# Patient Record
Sex: FEMALE | Race: WHITE | NOT HISPANIC OR LATINO | Employment: FULL TIME | URBAN - METROPOLITAN AREA
[De-identification: names, ages, dates, MRNs, and addresses within clinical notes are randomized per-mention and may not be internally consistent; named-entity substitution may affect disease eponyms.]

---

## 2018-10-19 ENCOUNTER — OFFICE VISIT (OUTPATIENT)
Dept: URGENT CARE | Facility: CLINIC | Age: 48
End: 2018-10-19
Payer: COMMERCIAL

## 2018-10-19 VITALS
OXYGEN SATURATION: 97 % | WEIGHT: 160 LBS | HEIGHT: 64 IN | BODY MASS INDEX: 27.31 KG/M2 | RESPIRATION RATE: 18 BRPM | HEART RATE: 96 BPM | DIASTOLIC BLOOD PRESSURE: 95 MMHG | TEMPERATURE: 98 F | SYSTOLIC BLOOD PRESSURE: 151 MMHG

## 2018-10-19 DIAGNOSIS — Z76.89 ESTABLISHING CARE WITH NEW DOCTOR, ENCOUNTER FOR: ICD-10-CM

## 2018-10-19 DIAGNOSIS — R35.0 URINARY FREQUENCY: ICD-10-CM

## 2018-10-19 DIAGNOSIS — Z11.3 ENCOUNTER FOR SCREENING EXAMINATION FOR SEXUALLY TRANSMITTED DISEASE: Primary | ICD-10-CM

## 2018-10-19 DIAGNOSIS — F17.200 TOBACCO DEPENDENCE: ICD-10-CM

## 2018-10-19 LAB
BILIRUB UR QL STRIP: NEGATIVE
GLUCOSE UR QL STRIP: NEGATIVE
KETONES UR QL STRIP: NEGATIVE
LEUKOCYTE ESTERASE UR QL STRIP: NEGATIVE
PH, POC UA: 6 (ref 5–8)
POC BLOOD, URINE: NEGATIVE
POC NITRATES, URINE: NEGATIVE
PROT UR QL STRIP: NEGATIVE
SP GR UR STRIP: 1.01 (ref 1–1.03)
UROBILINOGEN UR STRIP-ACNC: NORMAL (ref 0.1–1.1)

## 2018-10-19 PROCEDURE — 81003 URINALYSIS AUTO W/O SCOPE: CPT | Mod: QW,S$GLB,, | Performed by: NURSE PRACTITIONER

## 2018-10-19 PROCEDURE — 99204 OFFICE O/P NEW MOD 45 MIN: CPT | Mod: 25,S$GLB,, | Performed by: NURSE PRACTITIONER

## 2018-10-19 PROCEDURE — 3008F BODY MASS INDEX DOCD: CPT | Mod: CPTII,S$GLB,, | Performed by: NURSE PRACTITIONER

## 2018-10-19 RX ORDER — LORATADINE 10 MG/1
10 TABLET ORAL DAILY
COMMUNITY
End: 2018-10-24 | Stop reason: SDUPTHER

## 2018-10-19 RX ORDER — OMEPRAZOLE 10 MG/1
10 CAPSULE, DELAYED RELEASE ORAL DAILY
COMMUNITY
End: 2018-10-24 | Stop reason: DRUGHIGH

## 2018-10-19 RX ORDER — CITALOPRAM 10 MG/1
10 TABLET ORAL DAILY
COMMUNITY
End: 2018-10-24 | Stop reason: SDUPTHER

## 2018-10-19 NOTE — PATIENT INSTRUCTIONS
If You Think You Have an STD  Treating a sexually transmitted disease (STD) early limits the problems they can cause. If you have an STD, get treated right away. Ask your partner to be tested, too. Then avoid sex until youve finished treatment and your healthcare provider says its OK to have sex again.    Follow your treatment plan  Treatment depends on the type of STD you have. Common treatments include injections and oral pills or liquids. Creams and gels can be applied to sores caused by certain STDs. Follow the tips below:  · Get new treatment for each new STD.  · Dont use old medicine, even for the same STD. Use medicines as directed.  · Dont share medicine unless instructed to do so by your healthcare provider or clinic.  Talk to your partner  If you have an STD, its your duty to tell all your recent partners so they can be tested and treated. This is one important way to prevent the disease from being spread. Telling a partner that you have an STD can be hard. You may be embarrassed, angry, or afraid. Its often unclear who had the STD first. So try not to place blame. Your healthcare provider may offer some advice on how to begin.  Prevent future problems  Even after youve been treated, you can still be infected again. This is a common problem. It happens when a partner passes the STD back to you. To avoid this, your partner must be tested. He or she may also need treatment. After treatment, go to any scheduled follow-up visits. Then prevent future problems with safer sex. Limit your number of partners and always use a latex condom.  Diagnosing STDS  Your healthcare provider will take a health history and examine you. During your health history, you will be asked about your sex habits and health history. You may also be asked about drug use. Give honest answers. Your healthcare provider will then check your body for signs of STDs. He or she also may perform one or more of the following  tests:  · Fluid is swabbed from any sores. Samples also may be taken from the vagina, penis, mouth, or rectum. The samples are then tested for STDs.  · Blood or urine samples may be taken. They are checked for viruses or bacteria that cause STDs.  · For women, cells from the cervix (where the vagina and uterus meet) are checked for signs of cancer. This is called a Pap smear. If cell changes are found, a magnifying scope may be used to take a closer look (colposcopy).   Date Last Reviewed: 12/1/2016  © 6825-2990 [employer]. [address] [place] [address], [place] [address]. All rights reserved. This information is not intended as a substitute for professional medical care. Always follow your healthcare professional's instructions.    Please arrange follow up with your primary medical clinic as soon as possible. You must understand that you've received an Urgent Care treatment only and that you may be released before all of your medical problems are known or treated. You, the patient, will arrange for follow up as instructed. If your symptoms worsen or fail to improve you should go to the Emergency Room.

## 2018-10-19 NOTE — PROGRESS NOTES
"Subjective:       Patient ID: Esme Mehta is a 48 y.o. female.    Vitals:  height is 5' 4" (1.626 m) and weight is 72.6 kg (160 lb). Her tympanic temperature is 98 °F (36.7 °C). Her blood pressure is 151/95 (abnormal) and her pulse is 96. Her respiration is 18 and oxygen saturation is 97%.     Chief Complaint: Urinary Frequency    Patient presents with c/o urinary frequency x 1 days. Symptom started yesterday. Patient tried at home test and she is worried she has a uti. Patient gives hx of having unprotected sex last weekend. No concern for pregnancy. Patient would like to be tested for stds.       Urinary Frequency    This is a new problem. The current episode started acute onset. The problem occurs every urination. The problem has been unchanged. She is sexually active. There is no history of pyelonephritis. Associated symptoms include frequency. Pertinent negatives include no chills, hematuria, nausea, urgency or vomiting. She has tried nothing for the symptoms. The treatment provided mild relief.     Review of Systems   Constitution: Negative for chills and fever.   Skin: Negative for itching.   Musculoskeletal: Negative for back pain.   Gastrointestinal: Negative for abdominal pain, nausea and vomiting.   Genitourinary: Positive for frequency. Negative for dysuria, genital sores, hematuria, missed menses, non-menstrual bleeding and urgency.   All other systems reviewed and are negative.      Objective:      Physical Exam   Constitutional: She is oriented to person, place, and time. She appears well-developed and well-nourished.   HENT:   Head: Normocephalic and atraumatic.   Right Ear: External ear normal.   Left Ear: External ear normal.   Nose: Nose normal.   Eyes: Lids are normal.   Neck: Trachea normal, normal range of motion and phonation normal. Neck supple.   Cardiovascular: Normal pulses.   Pulmonary/Chest: Effort normal.   Abdominal: Soft. Normal appearance and bowel sounds are normal. She " exhibits no distension. There is no tenderness. There is no CVA tenderness. Hernia confirmed negative in the right inguinal area and confirmed negative in the left inguinal area.   Genitourinary: Vagina normal. Pelvic exam was performed with patient supine. There is no rash, tenderness, lesion or injury on the right labia. There is no rash, tenderness, lesion or injury on the left labia. Cervix exhibits discharge (thin white discharge, IUD string visable on exam). Cervix exhibits no motion tenderness.   Lymphadenopathy: No inguinal adenopathy noted on the right or left side.   Neurological: She is alert and oriented to person, place, and time.   Skin: Skin is warm, dry and intact.   Psychiatric: She has a normal mood and affect. Her speech is normal and behavior is normal. Cognition and memory are normal.   Nursing note and vitals reviewed.      Assessment:       1. Encounter for screening examination for sexually transmitted disease    2. Urinary frequency    3. Tobacco dependence    4. Establishing care with new doctor, encounter for        Plan:     F/u with GYN and PCP.     Encounter for screening examination for sexually transmitted disease  -     NuSwab Vaginitis Plus (VG+)  -     HIV-1 and HIV-2 antibodies  -     RPR  -     Hepatitis panel, acute    Urinary frequency  -     POCT Urinalysis, Dipstick, Automated, W/O Scope  -     Urine culture    Tobacco dependence  -     Ambulatory referral to Smoking Cessation Program    Establishing care with new doctor, encounter for  -     Ambulatory referral to Internal Medicine  -     Ambulatory referral to Gynecology

## 2018-10-21 LAB
HAV IGM SERPL QL IA: NEGATIVE
HBV CORE IGM SERPL QL IA: NEGATIVE
HBV SURFACE AG SERPL QL IA: NEGATIVE
HCV AB S/CO SERPL IA: <0.1 S/CO RATIO (ref 0–0.9)
HIV 1+2 AB+HIV1 P24 AG SERPL QL IA: NON REACTIVE
RPR SER QL: NON REACTIVE

## 2018-10-24 ENCOUNTER — OFFICE VISIT (OUTPATIENT)
Dept: INTERNAL MEDICINE | Facility: CLINIC | Age: 48
End: 2018-10-24
Payer: COMMERCIAL

## 2018-10-24 VITALS
HEART RATE: 97 BPM | WEIGHT: 163.81 LBS | DIASTOLIC BLOOD PRESSURE: 102 MMHG | OXYGEN SATURATION: 98 % | BODY MASS INDEX: 27.96 KG/M2 | SYSTOLIC BLOOD PRESSURE: 168 MMHG | RESPIRATION RATE: 12 BRPM | HEIGHT: 64 IN

## 2018-10-24 DIAGNOSIS — R06.81 WITNESSED APNEIC SPELLS: ICD-10-CM

## 2018-10-24 DIAGNOSIS — F17.210 LIGHT TOBACCO SMOKER <10 CIGARETTES PER DAY: ICD-10-CM

## 2018-10-24 DIAGNOSIS — F41.9 ANXIETY DISORDER, UNSPECIFIED TYPE: ICD-10-CM

## 2018-10-24 DIAGNOSIS — K58.2 IRRITABLE BOWEL SYNDROME WITH BOTH CONSTIPATION AND DIARRHEA: ICD-10-CM

## 2018-10-24 DIAGNOSIS — Z13.1 SCREENING FOR DIABETES MELLITUS: ICD-10-CM

## 2018-10-24 DIAGNOSIS — F31.60 BIPOLAR AFFECTIVE DISORDER, CURRENT EPISODE MIXED, CURRENT EPISODE SEVERITY UNSPECIFIED: ICD-10-CM

## 2018-10-24 DIAGNOSIS — Z00.00 ANNUAL PHYSICAL EXAM: Primary | ICD-10-CM

## 2018-10-24 DIAGNOSIS — J30.9 ALLERGIC RHINITIS, UNSPECIFIED SEASONALITY, UNSPECIFIED TRIGGER: ICD-10-CM

## 2018-10-24 DIAGNOSIS — R06.83 SNORING: ICD-10-CM

## 2018-10-24 DIAGNOSIS — R03.0 ELEVATED BLOOD PRESSURE READING IN OFFICE WITHOUT DIAGNOSIS OF HYPERTENSION: ICD-10-CM

## 2018-10-24 DIAGNOSIS — Z13.220 SCREENING FOR LIPID DISORDERS: ICD-10-CM

## 2018-10-24 DIAGNOSIS — R40.0 DAYTIME SOMNOLENCE: ICD-10-CM

## 2018-10-24 DIAGNOSIS — R07.9 CHEST PAIN OF UNCERTAIN ETIOLOGY: ICD-10-CM

## 2018-10-24 DIAGNOSIS — K21.9 GASTROESOPHAGEAL REFLUX DISEASE, ESOPHAGITIS PRESENCE NOT SPECIFIED: ICD-10-CM

## 2018-10-24 LAB
A VAGINAE DNA VAG QL NAA+PROBE: ABNORMAL SCORE
BACTERIA UR CULT: NO GROWTH
BACTERIA UR CULT: NORMAL
BVAB2 DNA VAG QL NAA+PROBE: ABNORMAL SCORE
C ALBICANS DNA VAG QL NAA+PROBE: NEGATIVE
C GLABRATA DNA VAG QL NAA+PROBE: NEGATIVE
C TRACH RRNA SPEC QL NAA+PROBE: NEGATIVE
MEGA1 DNA VAG QL NAA+PROBE: ABNORMAL SCORE
N GONORRHOEA RRNA SPEC QL NAA+PROBE: NEGATIVE
T VAGINALIS RRNA SPEC QL NAA+PROBE: NEGATIVE

## 2018-10-24 PROCEDURE — 99386 PREV VISIT NEW AGE 40-64: CPT | Mod: S$GLB,,, | Performed by: FAMILY MEDICINE

## 2018-10-24 PROCEDURE — 99999 PR PBB SHADOW E&M-EST. PATIENT-LVL V: CPT | Mod: PBBFAC,,, | Performed by: FAMILY MEDICINE

## 2018-10-24 RX ORDER — LORATADINE 10 MG/1
10 TABLET ORAL DAILY
Qty: 90 TABLET | Refills: 1 | Status: SHIPPED | OUTPATIENT
Start: 2018-10-24

## 2018-10-24 RX ORDER — OMEPRAZOLE 20 MG/1
20 CAPSULE, DELAYED RELEASE ORAL DAILY
Qty: 90 CAPSULE | Refills: 0 | Status: SHIPPED | OUTPATIENT
Start: 2018-10-24 | End: 2019-01-19 | Stop reason: SDUPTHER

## 2018-10-24 RX ORDER — OMEPRAZOLE 10 MG/1
10 CAPSULE, DELAYED RELEASE ORAL DAILY
Status: CANCELLED | OUTPATIENT
Start: 2018-10-24

## 2018-10-24 RX ORDER — CITALOPRAM 10 MG/1
10 TABLET ORAL DAILY
Qty: 30 TABLET | Refills: 2 | Status: SHIPPED | OUTPATIENT
Start: 2018-10-24

## 2018-10-24 RX ORDER — OMEPRAZOLE 20 MG/1
20 CAPSULE, DELAYED RELEASE ORAL DAILY
COMMUNITY
End: 2018-10-24 | Stop reason: SDUPTHER

## 2018-10-24 RX ORDER — QUETIAPINE FUMARATE 50 MG/1
50 TABLET, FILM COATED ORAL NIGHTLY
Qty: 30 TABLET | Refills: 0 | Status: SHIPPED | OUTPATIENT
Start: 2018-10-24 | End: 2019-04-29 | Stop reason: ALTCHOICE

## 2018-10-24 NOTE — PROGRESS NOTES
Subjective:       Patient ID: Esme Mehta is a 48 y.o. female.    Chief Complaint: Establish Care    HPI  This patient is new to me.   Esme Mehta is a 48 y.o. year old female with anxiety and bipolar depression, IBS who presents today to establish care.     Moved from [place] 8 months ago. Was seeing psychiatry.  Anxiety and bipolar depression - used to take Prozac and Wellbutrin with a benzo - was working.   Spend a few days inpatient psych years ago. Hx of bad relationships.   Has manic episodes more lately. Symptoms include excited feeling in stomach, feeling anxious.   Says she sleeps ok.   Hx two suicide attempts in .   Does have suicidal thoughts occasionally. Says she would not hurt herself. Is a Christianity, her father, dog are protective factors.   Doesn't feel Celexa is helping much, still having anxiety and depression. Feels manic off and on. She does not have a psychiatrist.     Has apneic episodes at night, daytime fatigue, does snore.     Abdominal pain - off and on. Present for a few months. Sometimes worse around meals. Has some vomiting after meals sometimes - occurs a couple times a month. Has a feeling of food stuck in her throat sometimes.   Sometimes sees very dark stool.   Acid reflux - takes omeprazole 10mg daily. Sometimes helps.     Occasional chest pain. No trigger. Mostly occurs with anxiety. Lasts for a couple minutes. Breathing exercises helps.     Hx of breast CA - BRCA neg (was tested a few years ago)  She is a tobacco smoker and was referred to smoking cessation at recent urgent care appt. Waiting to hear from smoking cessation program.     OB/GYN History     Unknown if patient is post-menopausal due to IUD - does mention hot flashes  Sexually active: yes  Contraception: IUD - 5 years ago  Last Pap smear: 2013 - normal  Mammogram: 2013 - normal. Has had biopsies - negative.     Has gyn appt soon.     I personally reviewed Past Medical History, Past  "Surgical History, Social History, and Family History    Review of Systems   Constitutional: Positive for fatigue. Negative for chills, fever and unexpected weight change.   HENT: Negative for congestion, hearing loss, rhinorrhea and sore throat.    Eyes: Negative for visual disturbance.   Respiratory: Positive for cough. Negative for shortness of breath and wheezing.    Cardiovascular: Positive for chest pain and palpitations. Negative for leg swelling.   Gastrointestinal: Positive for abdominal pain, constipation, diarrhea, nausea and vomiting. Negative for blood in stool.        +food getting stuck   Genitourinary: Negative for dysuria, frequency, menstrual problem and urgency.   Musculoskeletal: Negative for arthralgias and myalgias.   Skin: Negative for rash.   Neurological: Negative for dizziness, syncope and headaches.   Psychiatric/Behavioral: Positive for dysphoric mood and suicidal ideas. Negative for self-injury and sleep disturbance. The patient is nervous/anxious.        Objective:      Vitals:    10/24/18 1524   BP: (!) 168/102   Pulse: 97   Resp: 12   SpO2: 98%   Weight: 74.3 kg (163 lb 12.8 oz)   Height: 5' 4" (1.626 m)     Physical Exam   Constitutional: She is oriented to person, place, and time. She appears well-developed and well-nourished. No distress.   HENT:   Head: Normocephalic and atraumatic.   Right Ear: Hearing, tympanic membrane, external ear and ear canal normal.   Left Ear: Hearing, tympanic membrane, external ear and ear canal normal.   Nose: Nose normal.   Mouth/Throat: Oropharynx is clear and moist and mucous membranes are normal. No oropharyngeal exudate.   Eyes: Conjunctivae and lids are normal. Pupils are equal, round, and reactive to light.   Neck: Normal range of motion. No thyroid mass and no thyromegaly present.   Cardiovascular: Normal rate, regular rhythm, S1 normal, S2 normal and intact distal pulses.   No murmur heard.  No lower extremity edema.    Pulmonary/Chest: Effort " normal and breath sounds normal. No respiratory distress.   Abdominal: Soft. Normal appearance and bowel sounds are normal. There is no tenderness. There is negative Davis's sign.   Lymphadenopathy:     She has no cervical adenopathy.        Right: No supraclavicular adenopathy present.        Left: No supraclavicular adenopathy present.   Neurological: She is alert and oriented to person, place, and time.   Skin: Skin is warm and dry. No rash noted.   Psychiatric: She has a normal mood and affect. Her behavior is normal. Thought content normal.   Nursing note and vitals reviewed.      Assessment:       1. Annual physical exam    2. Bipolar affective disorder, current episode mixed, current episode severity unspecified    3. Anxiety disorder, unspecified type    4. Elevated blood pressure reading in office without diagnosis of hypertension    5. Light tobacco smoker <10 cigarettes per day    6. Daytime somnolence    7. Witnessed apneic spells    8. Snoring    9. Chest pain of uncertain etiology    10. Irritable bowel syndrome with both constipation and diarrhea    11. Gastroesophageal reflux disease, esophagitis presence not specified    12. Allergic rhinitis, unspecified seasonality, unspecified trigger    13. Screening for lipid disorders    14. Screening for diabetes mellitus        Plan:   Diagnoses and all orders for this visit:    Annual physical exam  - Screening labs ordered. Discussed healthy diet and exercise. Recommended that she have Tdap vaccine. She will see gyn soon and get up to date with Pap and mammo.   -     CBC auto differential; Future  -     Comprehensive metabolic panel; Future  -     Lipid panel; Future  -     TSH; Future    Bipolar affective disorder, current episode mixed, current episode severity unspecified  Anxiety disorder, unspecified type        - Patient referred to psychiatry. Sole SSRI treatment is not appropriate for bipolar disorder typically. Will continue Celexa for now, but  add Seroquel at bedtime until she can see psych. Discussed suicide risk and seeking help for suicidal thoughts. RTC in 4 weeks for follow-up.   -     citalopram (CELEXA) 10 MG tablet; Take 1 tablet (10 mg total) by mouth once daily.  -     Ambulatory Referral to Psychiatry  -     QUEtiapine (SEROQUEL) 50 MG tablet; Take 1 tablet (50 mg total) by mouth every evening.    Elevated blood pressure in the office without diagnosis of hypertension         - Patient will RTC in 2 weeks for nurse visit to recheck BP. Will add pharmacotherapy if elevated.     Light tobacco smoker <10 cigarettes each day          - Counseled patient on cessation. She plans to quit with assistance of tobacco cessation program.     Daytime somnolence  Witnessed apneic spells  Snoring  - Will refer to sleep center for sleep study   -     Ambulatory consult to Sleep Disorders    Chest pain of uncertain etiology        - Nonexertional and seems to be due to GERD. Not having chest pain now. Will check EKG for baseline and manage accordingly.   -     SCHEDULED EKG 12-LEAD (to Muse); Future    Irritable bowel syndrome with both constipation and diarrhea  -     Ambulatory Referral to Gastroenterology    Gastroesophageal reflux disease, esophagitis presence not specified        - Discussed GERD diet and lifestyle changes. Refer to GI for possible endoscopy.   -     Ambulatory Referral to Gastroenterology  -     omeprazole (PRILOSEC) 20 MG capsule; Take 1 capsule (20 mg total) by mouth once daily.    Allergic rhinitis, unspecified seasonality, unspecified trigger  -     loratadine (CLARITIN) 10 mg tablet; Take 1 tablet (10 mg total) by mouth once daily.    Screening for lipid disorders  -     Lipid panel; Future    Screening for diabetes mellitus  -     Comprehensive metabolic panel; Future

## 2018-10-24 NOTE — LETTER
October 24, 2018      Nallely Bob, DANGELO  [address] [facility] [place] [address]           [facility]  [address]  [facility] [address]  Phone: [phone]  Fax: [phone]          Patient: Esme Mehta   MR Number: [id number]   YOB: 1970   Date of Visit: 10/24/2018       Dear Nallely Bob:    Thank you for referring Esme Mehta to me for evaluation. Attached you will find relevant portions of my assessment and plan of care.    If you have questions, please do not hesitate to call me. I look forward to following Esme Mehta along with you.    Sincerely,    Hailey Khan MD    Enclosure  CC:  No Recipients    If you would like to receive this communication electronically, please contact [email or web address] or [phone] to request more information on [employer] access.    For providers and/or their staff who would like to refer a patient to [facility], please contact us through our one-stop-shop provider referral line, [facility], at [phone].    If you feel you have received this communication in error or would no longer like to receive these types of communications, please e-mail [email or web address]

## 2018-10-24 NOTE — PATIENT INSTRUCTIONS
Other psychiatrists:  Dr. Desmond Ponce - [phone]  Dr. Kem Cole - [phone]  Dr. Shayy Finn - [phone]  Dr. Allison Taveras - [phone]  Dr. Nicholas Jensen - [phone]    Tips to Control Acid Reflux    To control acid reflux, youll need to make some basic diet and lifestyle changes. The simple steps outlined below may be all youll need to ease discomfort.  Watch what you eat  · Avoid fatty foods and spicy foods.  · Eat fewer acidic foods, such as citrus and tomato-based foods. These can increase symptoms.  · Limit drinking alcohol, caffeine, and fizzy beverages. All increase acid reflux.  · Try limiting chocolate, peppermint, and spearmint. These can worsen acid reflux in some people.  Watch when you eat  · Avoid lying down for 3 hours after eating.  · Do not snack before going to bed.  Raise your head  Raising your head and upper body by 4 to 6 inches helps limit reflux when youre lying down. Put blocks under the head of your bed frame to raise it.  Other changes  · Lose weight, if you need to  · Dont exercise near bedtime  · Avoid tight-fitting clothes  · Limit aspirin and ibuprofen  · Stop smoking   Date Last Reviewed: 7/1/2016  © 2288-6398 [employer]. [address] [place] [address], [place] [address]. All rights reserved. This information is not intended as a substitute for professional medical care. Always follow your healthcare professional's instructions.        Treating Bipolar Disorder    Bipolar disorder results in extreme mood swings that can greatly disrupt your life. These symptoms may cause you distress. But with treatment, you can lead a more normal life.  Medicines  Bipolar disorder is often treated with medicines that stabilize moods. They help you feel better by keeping your moods more even, and help prevent future mood swings. Sometimes you may also be prescribed medicines that treat depression. All medicines can have side effects. If youre  troubled by side effects, tell your healthcare provider. Changing the dose or type of your medicine may help. But dont stop taking medicines until your healthcare provider tells you. If you do, your symptoms will likely come back.  Talk therapy (psychotherapy)  Talking to a therapist or counselor may be part of your treatment. Having bipolar disorder can make it hard to hold a job or go to school. It can create stress for both you and your loved ones. A therapist can teach you how to cope with bipolar disorder. This can help you lessen manic or depressive episodes, or even prevent them. Your therapist can help you work out problems and heal relationships. He or she can also provide support when you need it most.  Friends and family  Those closest to you may also need support. There are many groups for families of people with bipolar disorder. Learning more about this disorder can help your loved ones cope. It can also help them take an active role in your care.  Looking ahead  People with bipolar disorder have periods with no symptoms. But it is a chronic illness that requires lifetime care. Just as with heart conditions or diabetes, bipolar symptoms can return or treatments many need to be changed. Ongoing professional support is key to effective long-term management. Much research is being done on bipolar disorder. This research may lead to improved treatments and hope for a better future.  Resources  · National [place] of Mental Health  159.880.8575  www.nimh.nih.gov  · National [place] on Mental Illness  270.844.9196  www.olga.org  · Mental Health Susy  821.392.4454  www.[facility].org  · National Suicide Prevention Lifeline 463-710-EKKS (556-339-7652) www.suicidepreventionlifeline.org   Date Last Reviewed: 5/1/2017  © 7627-1509 [employer]. [address] [place] [address], [place] [address]. All rights reserved. This information is not intended as a substitute for professional medical care. Always follow  "your healthcare professional's instructions.        Recognizing Suicide Warning Signs in Yourself  People who are thinking about suicide may not know they are depressed. Certain thoughts, feelings, and actions can be signals that let you know you may need help. The best thing you can do is watch for signs that you may be at risk. Then, ask for help. You can talk to your regular healthcare provider or seek help from a mental health provider.    Depression  Depression is a treatable illness. To know if depression is causing you to feel like ending your life, ask yourself:  · Do I feel worthless, guilty, helpless, or hopeless?  · Have I been feeling sad, down, or blue on most days?  · Have I lost interest in my work or people I used to enjoy?  · Do I have trouble sleeping or do I sleep too much?  · Do I eat more or less than usual?  · Do I feel tired, weak, and low on energy?  · Do I feel restless and unable to sit still?  · Do I have trouble thinking or making choices?  · Do I cry more than usual?  · Do I feel life isn't worth living?  Warning signs for suicide  · Thinking often about taking your life  · Planning how you may attempt it  · Talking or writing about committing suicide  · Feeling that death is the only solution to your problems  · Feeling a pressing need to make out your will or arrange your   · Giving away things you own  · Participating in risky behaviors, such as sex with someone you don't know or drinking and driving  · Buying a lethal weapon, such as a gun, or hoarding medicines that could be used in an over dose  If you notice any of these warning signs, call for help right away or go to your closest hospital emergency department. You can also call a mental health clinic or a 24-hour suicide crisis hotline for help and support. Search for local suicide prevention resources on your computer or look for the number in the white pages of your phone book under "Suicide." In an emergency, if you " are in immediate risk of harming yourself, call 911. For more information about depression:  · National [place] of Mental Ecfaxj263-110-3134pth.nimh.nih.gov  · National Suicide Prevention Otakpxdl349-009-7489 (784-477-HBGI)www.suicidepreventionlifeline.org  · National [place] on Mental Svluyfq079-592-6097idu.olga.org  · Mental Health Fnlwkjw548-053-4821jaw.[facility].org  · National Suicide Btobsib078-381-4259 (800-SUICIDE)   Date Last Reviewed: 1/1/2017  © 7436-6267 [employer]. [address] [place] [address], [place] [address]. All rights reserved. This information is not intended as a substitute for professional medical care. Always follow your healthcare professional's instructions.

## 2018-10-25 ENCOUNTER — TELEPHONE (OUTPATIENT)
Dept: URGENT CARE | Facility: CLINIC | Age: 48
End: 2018-10-25

## 2018-10-25 DIAGNOSIS — B96.89 BV (BACTERIAL VAGINOSIS): Primary | ICD-10-CM

## 2018-10-25 DIAGNOSIS — N76.0 BV (BACTERIAL VAGINOSIS): Primary | ICD-10-CM

## 2018-10-25 RX ORDER — METRONIDAZOLE 500 MG/1
500 TABLET ORAL EVERY 12 HOURS
Qty: 14 TABLET | Refills: 0 | Status: SHIPPED | OUTPATIENT
Start: 2018-10-25 | End: 2018-11-01

## 2018-10-30 ENCOUNTER — OFFICE VISIT (OUTPATIENT)
Dept: SLEEP MEDICINE | Facility: CLINIC | Age: 48
End: 2018-10-30
Payer: COMMERCIAL

## 2018-10-30 ENCOUNTER — HOSPITAL ENCOUNTER (OUTPATIENT)
Dept: CARDIOLOGY | Facility: OTHER | Age: 48
Discharge: HOME | End: 2018-10-30
Attending: FAMILY MEDICINE
Payer: COMMERCIAL

## 2018-10-30 VITALS
WEIGHT: 162.5 LBS | BODY MASS INDEX: 27.89 KG/M2 | DIASTOLIC BLOOD PRESSURE: 70 MMHG | HEART RATE: 74 BPM | SYSTOLIC BLOOD PRESSURE: 122 MMHG

## 2018-10-30 DIAGNOSIS — R07.9 CHEST PAIN OF UNCERTAIN ETIOLOGY: ICD-10-CM

## 2018-10-30 DIAGNOSIS — G47.30 SLEEP APNEA, UNSPECIFIED TYPE: Primary | ICD-10-CM

## 2018-10-30 DIAGNOSIS — F51.04 PSYCHOPHYSIOLOGICAL INSOMNIA: ICD-10-CM

## 2018-10-30 PROCEDURE — 99999 PR PBB SHADOW E&M-EST. PATIENT-LVL III: CPT | Mod: PBBFAC,,, | Performed by: INTERNAL MEDICINE

## 2018-10-30 PROCEDURE — 3008F BODY MASS INDEX DOCD: CPT | Mod: CPTII,S$GLB,, | Performed by: INTERNAL MEDICINE

## 2018-10-30 PROCEDURE — 93005 ELECTROCARDIOGRAM TRACING: CPT

## 2018-10-30 PROCEDURE — 99204 OFFICE O/P NEW MOD 45 MIN: CPT | Mod: S$GLB,,, | Performed by: INTERNAL MEDICINE

## 2018-10-30 PROCEDURE — 93010 ELECTROCARDIOGRAM REPORT: CPT | Mod: ,,, | Performed by: INTERNAL MEDICINE

## 2018-10-30 NOTE — PATIENT INSTRUCTIONS
Sleep Hygiene Practices    1. Try going to bed only when you are drowsy.    2. If you are unable to fall asleep or stay asleep, leave your bedroom and engage in a quiet activity elsewhere. Do not permit yourself to fall asleep outside the bedroom. Return to bed when and only when you are sleepy. Repeat this process as often as necessary throughout night.    3. Maintain regular wake-up time, even on days off work & weekends    4. Use your bedroom for sleep and sex    5. Avoid napping during the daytime. If daytime sleepiness becomes overwhelming, limit nap time to a single nap of less than 1hr, no later than 3pm.    6. Distract your mind. Avoid clock watching. Lying in bed unable to sleep and frustrated needs to be avoided. Try reading or watching a videotape or listening to books on tape. It may be necessary to go into another room to do these.    7. Avoid caffeine within 4-6hrs of bedtime    8. Avoid use of nicotine close to bedtime    9. do not drink alcoholic beverages within 4-6hrs of bedtime    10. While a light snack before bedtime can help promote sound sleep, avoid large meals.    11. Obtain regular exercise, but avoid strenuous exercise within 4hrs of bedtime    12. Minimize light, noise, and extremes in temperature in the bedroom.    13. Precautions: The patient was advised to abstain from driving should they feel sleepy or drowsy.  ?      Quin or Erick will contact you to schedule your sleep study. Their number is [phone] (ext 2). [facility] is located on 7th floor of the [facility].    We will call you when the sleep study results are ready - if you have not heard from us by 2 weeks from the date of the study, please call [phone] (ext 1).    You are advised to abstain from driving should you feel sleepy or drowsy.        * This information is provided had been directly obtained from the American Academy of Sleep Medicine wellness booklet on sleep hygiene. ([address]  [facility], [address] [place] [address]
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
## 2018-10-30 NOTE — LETTER
October 30, 2018      Hailey Khan MD  [address]  [address]  [facility] [address]           [facility]  [address]  [facility] [address]  Phone: [phone]          Patient: Esme Mehta   MR Number: [id number]   YOB: 1970   Date of Visit: 10/30/2018       Dear Dr. Hailey Khan:    Thank you for referring Esme Mehta to me for evaluation. Attached you will find relevant portions of my assessment and plan of care.    If you have questions, please do not hesitate to call me. I look forward to following Esme Mehta along with you.    Sincerely,    Khalida Hightower MD    Enclosure  CC:  No Recipients    If you would like to receive this communication electronically, please contact [email or web address] or [phone] to request more information on [employer] Link access.    For providers and/or their staff who would like to refer a patient to [facility], please contact us through our one-stop-shop provider referral line, [facility], at [phone].    If you feel you have received this communication in error or would no longer like to receive these types of communications, please e-mail [email or web address]

## 2018-11-05 ENCOUNTER — OFFICE VISIT (OUTPATIENT)
Dept: OBSTETRICS AND GYNECOLOGY | Facility: CLINIC | Age: 48
End: 2018-11-05
Attending: OBSTETRICS & GYNECOLOGY

## 2018-11-05 ENCOUNTER — HOSPITAL ENCOUNTER (OUTPATIENT)
Dept: RADIOLOGY | Facility: OTHER | Age: 48
Discharge: HOME | End: 2018-11-05
Attending: OBSTETRICS & GYNECOLOGY

## 2018-11-05 VITALS
DIASTOLIC BLOOD PRESSURE: 88 MMHG | BODY MASS INDEX: 27.92 KG/M2 | HEIGHT: 64 IN | WEIGHT: 163.56 LBS | SYSTOLIC BLOOD PRESSURE: 130 MMHG

## 2018-11-05 DIAGNOSIS — Z12.31 SCREENING MAMMOGRAM, ENCOUNTER FOR: ICD-10-CM

## 2018-11-05 DIAGNOSIS — Z12.4 PAP SMEAR FOR CERVICAL CANCER SCREENING: ICD-10-CM

## 2018-11-05 DIAGNOSIS — Z01.419 ENCOUNTER FOR GYNECOLOGICAL EXAMINATION WITHOUT ABNORMAL FINDING: Primary | ICD-10-CM

## 2018-11-05 DIAGNOSIS — Z30.09 GENERAL COUNSELING AND ADVICE ON FEMALE CONTRACEPTION: ICD-10-CM

## 2018-11-05 PROCEDURE — 99213 OFFICE O/P EST LOW 20 MIN: CPT | Mod: PBBFAC | Performed by: OBSTETRICS & GYNECOLOGY

## 2018-11-05 PROCEDURE — 77067 SCR MAMMO BI INCL CAD: CPT | Mod: 26,,, | Performed by: RADIOLOGY

## 2018-11-05 PROCEDURE — 77063 BREAST TOMOSYNTHESIS BI: CPT | Mod: TC

## 2018-11-05 PROCEDURE — 99386 PREV VISIT NEW AGE 40-64: CPT | Mod: S$PBB,,, | Performed by: OBSTETRICS & GYNECOLOGY

## 2018-11-05 PROCEDURE — 88141 CYTOPATH C/V INTERPRET: CPT | Mod: ,,, | Performed by: PATHOLOGY

## 2018-11-05 PROCEDURE — 77063 BREAST TOMOSYNTHESIS BI: CPT | Mod: 26,,, | Performed by: RADIOLOGY

## 2018-11-05 PROCEDURE — 99999 PR PBB SHADOW E&M-EST. PATIENT-LVL III: CPT | Mod: PBBFAC,,, | Performed by: OBSTETRICS & GYNECOLOGY

## 2018-11-05 PROCEDURE — 87624 HPV HI-RISK TYP POOLED RSLT: CPT

## 2018-11-05 PROCEDURE — 88175 CYTOPATH C/V AUTO FLUID REDO: CPT | Performed by: PATHOLOGY

## 2018-11-05 RX ORDER — METRONIDAZOLE 250 MG/1
500 TABLET ORAL EVERY 6 HOURS
COMMUNITY
End: 2019-04-29 | Stop reason: ALTCHOICE

## 2018-11-05 NOTE — LETTER
November 5, 2018      Nallely Bob, DANGELO  [address] [facility] [address]           [facility] [address]  [address] [place] [address] [address]  [facility] [address]  Phone: [phone]  Fax: [phone]          Patient: Esme Mehta   MR Number: [id number]   YOB: 1970   Date of Visit: 11/5/2018       Dear Nallely Bob:    Thank you for referring Esme Mehta to me for evaluation. Attached you will find relevant portions of my assessment and plan of care.    If you have questions, please do not hesitate to call me. I look forward to following Esme Mehta along with you.    Sincerely,    Dian Carnes MD    Enclosure  CC:  No Recipients    If you would like to receive this communication electronically, please contact [email or web address] or [phone] to request more information on [employer] Link access.    For providers and/or their staff who would like to refer a patient to [facility], please contact us through our one-stop-shop provider referral line, [facility], at [phone].    If you feel you have received this communication in error or would no longer like to receive these types of communications, please e-mail [email or web address]

## 2018-11-05 NOTE — PROGRESS NOTES
Subjective:       Patient ID: Esme Mehta is a 48 y.o. female.    Chief Complaint:  Well Woman      History of Present Illness  HPI    Esme Mehta is a 48 y.o. female   NEW TO ME here for her annual GYN exam.  She is interested in getting another Mirena  She describes her periods as stopped with insertion of Mirena about 5 years ago,   denies break through bleeding.   denies vaginal itching or irritation.  Denies vaginal discharge.  She is not currently sexually active. She is not in a monogamous relationship.(She was in a brief relationship which has ended and tested negative for STD's recently.  She uses IUD for contraception.   History of abnormal pap: No  Last Pap: approximate date  and was normal  Last MMG: normal--routine follow-up in 12 months  Last Colonoscopy:  None  denies domestic violence. She does feel safe at home.     Past Medical History:   Diagnosis Date    Anxiety     Depression     GERD (gastroesophageal reflux disease)      Past Surgical History:   Procedure Laterality Date    BREAST BIOPSY      x 2     Social History     Socioeconomic History    Marital status: Single     Spouse name: Not on file    Number of children: Not on file    Years of education: Not on file    Highest education level: Not on file   Social Needs    Financial resource strain: Not on file    Food insecurity - worry: Not on file    Food insecurity - inability: Not on file    Transportation needs - medical: Not on file    Transportation needs - non-medical: Not on file   Occupational History     Comment:    Tobacco Use    Smoking status: Current Every Day Smoker     Packs/day: 0.50     Years: 25.00     Pack years: 12.50     Types: Cigarettes    Smokeless tobacco: Never Used    Tobacco comment: flyer given to patient [facility]   Substance and Sexual Activity    Alcohol use: Yes     Frequency: Monthly or less     Drinks per session: 5 or 6     Binge frequency: Less than  "monthly     Comment: rare    Drug use: Yes     Types: Marijuana     Comment: Hx meth and cocaine    Sexual activity: Yes     Partners: Male     Birth control/protection: IUD     Comment: not in a monogamous relationship; Mirena placed 2013   Other Topics Concern    Not on file   Social History Narrative    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      Family History   Problem Relation Age of Onset    Bipolar disorder Mother     Breast cancer Mother 45    Ovarian cancer Mother     Transient ischemic attack Father     Heart disease Father     Diabetes Father     Hypertension Father     Stroke Father     Bipolar disorder Maternal Aunt         two aunts with BPD    Breast cancer Maternal Aunt 50    Breast cancer Maternal Aunt 50    Colon cancer Neg Hx      OB History      Para Term  AB Living    0 0 0 0 0 0    SAB TAB Ectopic Multiple Live Births    0 0 0 0 0          /88 (BP Location: Right arm)   Ht 5' 4" (1.626 m)   Wt 74.2 kg (163 lb 9.3 oz)   LMP  (LMP Unknown) Comment: Mirena   BMI 28.08 kg/m²         GYN & OB History  No LMP recorded (lmp unknown). Patient has had an implant.   Date of Last Pap: No result found    OB History    Para Term  AB Living   0 0 0 0 0 0   SAB TAB Ectopic Multiple Live Births   0 0 0 0 0             Review of Systems  Review of Systems   Constitutional: Negative for activity change, appetite change, fatigue and unexpected weight change.   HENT: Negative.    Eyes: Negative for visual disturbance.   Respiratory: Negative for shortness of breath and wheezing.    Cardiovascular: Negative for chest pain, palpitations and leg swelling.   Gastrointestinal: Negative for abdominal pain, bloating and blood in stool.   Endocrine: Negative for diabetes and hair loss.   Genitourinary: Negative for decreased libido, dyspareunia and menstrual problem.   Musculoskeletal: Negative for back pain and joint " swelling.   Skin:  Negative for no acne and hair changes.   Neurological: Negative for headaches.   Hematological: Does not bruise/bleed easily.   Psychiatric/Behavioral: Negative for depression and sleep disturbance. The patient is not nervous/anxious.    Breast: Negative for breast pain and nipple discharge          Objective:      Physical Exam:   Constitutional: She is oriented to person, place, and time. She appears well-developed and well-nourished.    HENT:   Head: Normocephalic and atraumatic.    Eyes: EOM are normal. Pupils are equal, round, and reactive to light.    Neck: Normal range of motion. Neck supple.    Cardiovascular: Normal rate and regular rhythm.     Pulmonary/Chest: Effort normal and breath sounds normal.   BREASTS:  no mass, no tenderness, no deformity and no retraction. Right breast exhibits no inverted nipple, no mass, no nipple discharge, no skin change, no tenderness, no bleeding and no swelling. Left breast exhibits no inverted nipple, no mass, no nipple discharge, no skin change, no tenderness, no bleeding and no swelling. Breasts are symmetrical.              Abdominal: Soft. Bowel sounds are normal.     Genitourinary: Pelvic exam was performed with patient supine.   Genitourinary Comments: PELVIC: Normal external genitalia without lesions.  Normal hair distribution.  Adequate perineal body, normal urethral meatus.  Vagina moist and well rugated without lesions or discharge.  Cervix pink, IUD STRINGS IN PLACE,without lesions, discharge or tenderness.  No significant cystocele or rectocele.  Bimanual exam shows uterus to be normal size, regular, mobile and nontender.  Adnexa without masses or tenderness.               Musculoskeletal: Normal range of motion and moves all extremeties.       Neurological: She is alert and oriented to person, place, and time.    Skin: Skin is warm and dry.    Psychiatric: She has a normal mood and affect.              Assessment:        1. Encounter for  gynecological examination without abnormal finding    2. Pap smear for cervical cancer screening    3. Screening mammogram, encounter for    4. General counseling and advice on female contraception               Plan:        1. Encounter for gynecological examination without abnormal finding  COUNSELING:  The patient was counseled today on osteoporosis prevention, calcium supplementation, and regular weight bearing exercise. The patient was also counseled today on ACS PAP guidelines, with recommendations for yearly pelvic exams unless their uterus, cervix, and ovaries were removed for benign reasons; in that case, examinations every 3-5 years are recommended. The patient was also counseled regarding monthly breast self-examination, routine STD screening for at-risk populations, prophylactic immunizations for transmitted infections such as HPV, Pertussis, or Influenza as appropriate, and yearly mammograms when indicated by ACS guidelines. She was advised to see her primary care physician for all other health maintenance.   FOLLOW-UP with me for next routine visit.         2. Pap smear for cervical cancer screening    - Liquid-based pap smear, screening  - HPV High Risk Genotypes, PCR    3. Screening mammogram, encounter for    - Mammo Digital Screening Bilat w/ Stephon; Future       Follow-up in about 1 year (around 11/5/2019).

## 2018-11-06 ENCOUNTER — TELEPHONE (OUTPATIENT)
Dept: PHARMACY | Facility: CLINIC | Age: 48
End: 2018-11-06

## 2018-11-06 NOTE — TELEPHONE ENCOUNTER
Good Afternoon,     [facility] received a prescription for MIRENA. Upon calling the patient's insurance company, we have been told that IUDs are excluded under the patient's pharmacy benefits. [facility] is unable to bill medical claims for medications.     The medication itself, and the administration of the medication, will both have to be billed under the medical benefit and may require a prior authorization. Please contact [facility] with any questions at [phone].      Thank you,          Arlin Capps  [facility]        Patient Care Advocate         [facility]

## 2018-11-07 ENCOUNTER — TELEPHONE (OUTPATIENT)
Dept: INTERNAL MEDICINE | Facility: CLINIC | Age: 48
End: 2018-11-07

## 2018-11-07 ENCOUNTER — CLINICAL SUPPORT (OUTPATIENT)
Dept: INTERNAL MEDICINE | Facility: CLINIC | Age: 48
End: 2018-11-07
Payer: COMMERCIAL

## 2018-11-07 VITALS — HEART RATE: 87 BPM | DIASTOLIC BLOOD PRESSURE: 80 MMHG | SYSTOLIC BLOOD PRESSURE: 110 MMHG | OXYGEN SATURATION: 97 %

## 2018-11-07 PROCEDURE — 99999 PR PBB SHADOW E&M-EST. PATIENT-LVL I: CPT | Mod: PBBFAC,,,

## 2018-11-07 NOTE — PROGRESS NOTES
Esme Mehta 48 y.o. female is here today for Blood Pressure check.   History of HTN yes.    Review of patient's allergies indicates:  No Known Allergies  Creatinine   Date Value Ref Range Status   10/30/2018 0.8 0.5 - 1.4 mg/dL Final     Sodium   Date Value Ref Range Status   10/30/2018 141 136 - 145 mmol/L Final     Potassium   Date Value Ref Range Status   10/30/2018 4.7 3.5 - 5.1 mmol/L Final   ]  Patient denies taking blood pressure medications on a regular basis at the same time of the day.     Current Outpatient Medications:     citalopram (CELEXA) 10 MG tablet, Take 1 tablet (10 mg total) by mouth once daily., Disp: 30 tablet, Rfl: 2    levonorgestrel (MIRENA) 20 mcg/24 hr (5 years) IUD, 1 each by Intrauterine route once., Disp: , Rfl:     loratadine (CLARITIN) 10 mg tablet, Take 1 tablet (10 mg total) by mouth once daily., Disp: 90 tablet, Rfl: 1    metroNIDAZOLE (FLAGYL) 250 MG tablet, Take 500 mg by mouth every 6 (six) hours., Disp: , Rfl:     omeprazole (PRILOSEC) 20 MG capsule, Take 1 capsule (20 mg total) by mouth once daily., Disp: 90 capsule, Rfl: 0    QUEtiapine (SEROQUEL) 50 MG tablet, Take 1 tablet (50 mg total) by mouth every evening., Disp: 30 tablet, Rfl: 0  Does patient have record of home blood pressure readings no. Readings have been averaging unknown.   Last dose of blood pressure medication was taken at not taking any blood pressure medication.  Patient is asymptomatic.       BP: 110/80 , Pulse: 87 .      Dr. Khan notified.

## 2018-11-09 LAB
HPV HR 12 DNA CVX QL NAA+PROBE: NEGATIVE
HPV16 AG SPEC QL: NEGATIVE
HPV18 DNA SPEC QL NAA+PROBE: NEGATIVE

## 2018-11-16 ENCOUNTER — TELEPHONE (OUTPATIENT)
Dept: SLEEP MEDICINE | Facility: OTHER | Age: 48
End: 2018-11-16

## 2018-11-20 ENCOUNTER — TELEPHONE (OUTPATIENT)
Dept: INTERNAL MEDICINE | Facility: CLINIC | Age: 48
End: 2018-11-20

## 2018-11-26 ENCOUNTER — TELEPHONE (OUTPATIENT)
Dept: RADIOLOGY | Facility: OTHER | Age: 48
End: 2018-11-26

## 2018-11-26 NOTE — TELEPHONE ENCOUNTER
Called to update patient on status of outstanding mammo films request. Voicemail full. Films will be sent overnight.

## 2018-11-28 ENCOUNTER — TELEPHONE (OUTPATIENT)
Dept: SLEEP MEDICINE | Facility: OTHER | Age: 48
End: 2018-11-28

## 2018-11-30 ENCOUNTER — TELEPHONE (OUTPATIENT)
Dept: OBSTETRICS AND GYNECOLOGY | Facility: CLINIC | Age: 48
End: 2018-11-30

## 2018-11-30 ENCOUNTER — TELEPHONE (OUTPATIENT)
Dept: RADIOLOGY | Facility: OTHER | Age: 48
End: 2018-11-30

## 2018-11-30 DIAGNOSIS — Z30.9 ENCOUNTER FOR CONTRACEPTIVE MANAGEMENT, UNSPECIFIED TYPE: Primary | ICD-10-CM

## 2018-11-30 NOTE — TELEPHONE ENCOUNTER
Called to notify patient prior mammo films have been compared and radiologist has recommended additional imaging. No answer, voicemail full. Patient has not returned any previous calls. Will send certified results letter.

## 2018-12-03 ENCOUNTER — TELEPHONE (OUTPATIENT)
Dept: OBSTETRICS AND GYNECOLOGY | Facility: CLINIC | Age: 48
End: 2018-12-03

## 2018-12-03 DIAGNOSIS — Z30.9 ENCOUNTER FOR CONTRACEPTIVE MANAGEMENT, UNSPECIFIED TYPE: Primary | ICD-10-CM

## 2018-12-03 NOTE — TELEPHONE ENCOUNTER
----- Message from Arlin Capps sent at 11/6/2018  1:35 PM CST -----  Regarding: MIRENA. Buy and Bill.   Good Afternoon,              [facility] received a prescription for MIRENA. Upon calling the patient's insurance company, we have been told that IUDs are excluded under the patient's pharmacy benefits. [facility] is unable to bill medical claims for medications.               The medication itself, and the administration of the medication, will both have to be billed under the medical benefit and may require a prior authorization. Please contact [facility] with any questions at [phone].                Thank you,                                                       Arlin Capps [facility]                                                     Patient Care Advocate                                                     [facility]

## 2018-12-06 ENCOUNTER — HOSPITAL ENCOUNTER (OUTPATIENT)
Dept: RADIOLOGY | Facility: OTHER | Age: 48
Discharge: HOME | End: 2018-12-06
Attending: OBSTETRICS & GYNECOLOGY

## 2018-12-06 DIAGNOSIS — R92.8 ABNORMAL MAMMOGRAM: ICD-10-CM

## 2018-12-06 PROCEDURE — 77061 BREAST TOMOSYNTHESIS UNI: CPT | Mod: TC,LT

## 2018-12-06 PROCEDURE — 77065 DX MAMMO INCL CAD UNI: CPT | Mod: TC,LT

## 2018-12-06 PROCEDURE — 77065 DX MAMMO INCL CAD UNI: CPT | Mod: 26,LT,, | Performed by: RADIOLOGY

## 2018-12-06 PROCEDURE — 77061 BREAST TOMOSYNTHESIS UNI: CPT | Mod: 26,LT,, | Performed by: RADIOLOGY

## 2019-01-19 DIAGNOSIS — K21.9 GASTROESOPHAGEAL REFLUX DISEASE, ESOPHAGITIS PRESENCE NOT SPECIFIED: ICD-10-CM

## 2019-01-21 RX ORDER — OMEPRAZOLE 20 MG/1
CAPSULE, DELAYED RELEASE ORAL
Qty: 90 CAPSULE | Refills: 0 | Status: SHIPPED | OUTPATIENT
Start: 2019-01-21

## 2019-01-23 ENCOUNTER — TELEPHONE (OUTPATIENT)
Dept: OBSTETRICS AND GYNECOLOGY | Facility: CLINIC | Age: 49
End: 2019-01-23

## 2019-01-23 NOTE — TELEPHONE ENCOUNTER
----- Message from Dian Carnes MD sent at 1/22/2019  4:13 PM CST -----  Regarding: FW: MIRENA. Buy and Bill.       ----- Message -----  From: Arlin Capps  Sent: 11/6/2018   1:35 PM  To: Dian Carnes MD, #  Subject: MIRENA. Buy and Bill.                            Good Afternoon,              [facility] received a prescription for MIRENA. Upon calling the patient's insurance company, we have been told that IUDs are excluded under the patient's pharmacy benefits. [facility] is unable to bill medical claims for medications.               The medication itself, and the administration of the medication, will both have to be billed under the medical benefit and may require a prior authorization. Please contact [facility] with any questions at [phone].                Thank you,                                                       Arlin Capps [facility]                                                     Patient Care Advocate                                                     [facility]

## 2019-04-29 ENCOUNTER — OFFICE VISIT (OUTPATIENT)
Dept: URGENT CARE | Facility: CLINIC | Age: 49
End: 2019-04-29
Payer: COMMERCIAL

## 2019-04-29 VITALS
WEIGHT: 163 LBS | HEART RATE: 96 BPM | SYSTOLIC BLOOD PRESSURE: 118 MMHG | OXYGEN SATURATION: 98 % | RESPIRATION RATE: 18 BRPM | BODY MASS INDEX: 27.83 KG/M2 | TEMPERATURE: 98 F | HEIGHT: 64 IN | DIASTOLIC BLOOD PRESSURE: 90 MMHG

## 2019-04-29 DIAGNOSIS — J01.00 ACUTE NON-RECURRENT MAXILLARY SINUSITIS: Primary | ICD-10-CM

## 2019-04-29 DIAGNOSIS — H65.02 ACUTE SEROUS OTITIS MEDIA OF LEFT EAR, RECURRENCE NOT SPECIFIED: ICD-10-CM

## 2019-04-29 PROCEDURE — 99214 OFFICE O/P EST MOD 30 MIN: CPT | Mod: S$GLB,,, | Performed by: FAMILY MEDICINE

## 2019-04-29 PROCEDURE — 3008F BODY MASS INDEX DOCD: CPT | Mod: CPTII,S$GLB,, | Performed by: FAMILY MEDICINE

## 2019-04-29 PROCEDURE — 3008F PR BODY MASS INDEX (BMI) DOCUMENTED: ICD-10-PCS | Mod: CPTII,S$GLB,, | Performed by: FAMILY MEDICINE

## 2019-04-29 PROCEDURE — 99214 PR OFFICE/OUTPT VISIT, EST, LEVL IV, 30-39 MIN: ICD-10-PCS | Mod: S$GLB,,, | Performed by: FAMILY MEDICINE

## 2019-04-29 RX ORDER — AMOXICILLIN 500 MG/1
1000 TABLET, FILM COATED ORAL EVERY 12 HOURS
Qty: 40 TABLET | Refills: 0 | Status: SHIPPED | OUTPATIENT
Start: 2019-04-29 | End: 2019-05-09

## 2019-04-29 RX ORDER — FLUTICASONE PROPIONATE 50 MCG
2 SPRAY, SUSPENSION (ML) NASAL DAILY
Qty: 16 G | Refills: 12 | Status: SHIPPED | OUTPATIENT
Start: 2019-04-29 | End: 2019-05-29

## 2019-04-29 NOTE — PROGRESS NOTES
"Subjective:       Patient ID: Esme Mehta is a 48 y.o. female.    Vitals:  height is 5' 4" (1.626 m) and weight is 73.9 kg (163 lb). Her tympanic temperature is 98.1 °F (36.7 °C). Her blood pressure is 118/90 (abnormal) and her pulse is 96. Her respiration is 18 and oxygen saturation is 98%.     Chief Complaint: Sinus Problem    Patient presents with c/o cold like sxs for a week. Patient notes nausea and vomiting. On Friday, patient notes increased vomiting. Has tried robitussin and mucinex-no improvement.     Sinus Problem   This is a new problem. The current episode started in the past 7 days. The problem is unchanged. There has been no fever. Associated symptoms include chills, congestion, coughing and sinus pressure. Pertinent negatives include no diaphoresis, ear pain, shortness of breath or sore throat. Treatments tried: mucinex; robitussin. The treatment provided no relief.       Constitution: Positive for chills. Negative for sweating, fatigue and fever.   HENT: Positive for congestion, sinus pain, sinus pressure and voice change. Negative for ear pain and sore throat.    Neck: Negative for painful lymph nodes.   Eyes: Negative for eye redness.   Respiratory: Positive for cough and sputum production. Negative for chest tightness, bloody sputum, COPD, shortness of breath, stridor, wheezing and asthma.    Gastrointestinal: Positive for nausea and vomiting.   Musculoskeletal: Negative for muscle ache.   Skin: Negative for rash.   Allergic/Immunologic: Negative for seasonal allergies and asthma.   Hematologic/Lymphatic: Negative for swollen lymph nodes.       Objective:      Physical Exam   Constitutional: She appears well-developed and well-nourished.   HENT:   Head: Normocephalic and atraumatic.   Right Ear: External ear normal.   Left Ear: External ear normal.   Mild erythema of pharynx, Turbinate edema and congestion, left tm is swollen and red, left maxillary sinus is tender    Eyes: Pupils are " equal, round, and reactive to light. Conjunctivae and EOM are normal.   Neck: Normal range of motion. Neck supple. No thyromegaly present.   Cardiovascular: Normal rate, regular rhythm, normal heart sounds and intact distal pulses.   Pulmonary/Chest: Effort normal and breath sounds normal.   Lymphadenopathy:     She has no cervical adenopathy.   Psychiatric: She has a normal mood and affect. Her behavior is normal. Judgment and thought content normal.   Vitals reviewed.      Assessment:       1. Acute non-recurrent maxillary sinusitis    2. Acute serous otitis media of left ear, recurrence not specified        Plan:         Acute non-recurrent maxillary sinusitis  -     amoxicillin (AMOXIL) 500 MG Tab; Take 2 tablets (1,000 mg total) by mouth every 12 (twelve) hours. for 10 days  Dispense: 40 tablet; Refill: 0  -     fluticasone (FLONASE) 50 mcg/actuation nasal spray; 2 sprays (100 mcg total) by Each Nare route once daily.  Dispense: 16 g; Refill: 12    Acute serous otitis media of left ear, recurrence not specified  -     amoxicillin (AMOXIL) 500 MG Tab; Take 2 tablets (1,000 mg total) by mouth every 12 (twelve) hours. for 10 days  Dispense: 40 tablet; Refill: 0  -     fluticasone (FLONASE) 50 mcg/actuation nasal spray; 2 sprays (100 mcg total) by Each Nare route once daily.  Dispense: 16 g; Refill: 12          Patient Instructions     Sinusitis (Antibiotic Treatment)    The sinuses are air-filled spaces within the bones of the face. They connect to the inside of the nose. Sinusitis is an inflammation of the tissue lining the sinus cavity. Sinus inflammation can occur during a cold. It can also be due to allergies to pollens and other particles in the air. Sinusitis can cause symptoms of sinus congestion and fullness. A sinus infection causes fever, headache and facial pain. There is often green or yellow drainage from the nose or into the back of the throat (post-nasal drip). You have been given antibiotics to  treat this condition.  Home care:  · Take the full course of antibiotics as instructed. Do not stop taking them, even if you feel better.  · Drink plenty of water, hot tea, and other liquids. This may help thin mucus. It also may promote sinus drainage.  · Heat may help soothe painful areas of the face. Use a towel soaked in hot water. Or,  the shower and direct the hot spray onto your face. Using a vaporizer along with a menthol rub at night may also help.   · An expectorant containing guaifenesin may help thin the mucus and promote drainage from the sinuses.  · Over-the-counter decongestants may be used unless a similar medicine was prescribed. Nasal sprays work the fastest. Use one that contains phenylephrine or oxymetazoline. First blow the nose gently. Then use the spray. Do not use these medicines more often than directed on the label or symptoms may get worse. You may also use tablets containing pseudoephedrine. Avoid products that combine ingredients, because side effects may be increased. Read labels. You can also ask the pharmacist for help. (NOTE: Persons with high blood pressure should not use decongestants. They can raise blood pressure.)  · Over-the-counter antihistamines may help if allergies contributed to your sinusitis.    · Do not use nasal rinses or irrigation during an acute sinus infection, unless told to by your health care provider. Rinsing may spread the infection to other sinuses.  · Use acetaminophen or ibuprofen to control pain, unless another pain medicine was prescribed. (If you have chronic liver or kidney disease or ever had a stomach ulcer, talk with your doctor before using these medicines. Aspirin should never be used in anyone under 18 years of age who is ill with a fever. It may cause severe liver damage.)  · Don't smoke. This can worsen symptoms.  Follow-up care  Follow up with your healthcare provider or our staff if you are not improving within the next week.  When to  seek medical advice  Call your healthcare provider if any of these occur:  · Facial pain or headache becoming more severe  · Stiff neck  · Unusual drowsiness or confusion  · Swelling of the forehead or eyelids  · Vision problems, including blurred or double vision  · Fever of 100.4ºF (38ºC) or higher, or as directed by your healthcare provider  · Seizure  · Breathing problems  · Symptoms not resolving within 10 days  Date Last Reviewed: 4/13/2015  © 7374-1143 [employer]. [address] [place], [place] [id number]. All rights reserved. This information is not intended as a substitute for professional medical care. Always follow your healthcare professional's instructions.

## 2019-04-29 NOTE — PATIENT INSTRUCTIONS
Sinusitis (Antibiotic Treatment)    The sinuses are air-filled spaces within the bones of the face. They connect to the inside of the nose. Sinusitis is an inflammation of the tissue lining the sinus cavity. Sinus inflammation can occur during a cold. It can also be due to allergies to pollens and other particles in the air. Sinusitis can cause symptoms of sinus congestion and fullness. A sinus infection causes fever, headache and facial pain. There is often green or yellow drainage from the nose or into the back of the throat (post-nasal drip). You have been given antibiotics to treat this condition.  Home care:  · Take the full course of antibiotics as instructed. Do not stop taking them, even if you feel better.  · Drink plenty of water, hot tea, and other liquids. This may help thin mucus. It also may promote sinus drainage.  · Heat may help soothe painful areas of the face. Use a towel soaked in hot water. Or,  the shower and direct the hot spray onto your face. Using a vaporizer along with a menthol rub at night may also help.   · An expectorant containing guaifenesin may help thin the mucus and promote drainage from the sinuses.  · Over-the-counter decongestants may be used unless a similar medicine was prescribed. Nasal sprays work the fastest. Use one that contains phenylephrine or oxymetazoline. First blow the nose gently. Then use the spray. Do not use these medicines more often than directed on the label or symptoms may get worse. You may also use tablets containing pseudoephedrine. Avoid products that combine ingredients, because side effects may be increased. Read labels. You can also ask the pharmacist for help. (NOTE: Persons with high blood pressure should not use decongestants. They can raise blood pressure.)  · Over-the-counter antihistamines may help if allergies contributed to your sinusitis.    · Do not use nasal rinses or irrigation during an acute sinus infection, unless told to by  your health care provider. Rinsing may spread the infection to other sinuses.  · Use acetaminophen or ibuprofen to control pain, unless another pain medicine was prescribed. (If you have chronic liver or kidney disease or ever had a stomach ulcer, talk with your doctor before using these medicines. Aspirin should never be used in anyone under 18 years of age who is ill with a fever. It may cause severe liver damage.)  · Don't smoke. This can worsen symptoms.  Follow-up care  Follow up with your healthcare provider or our staff if you are not improving within the next week.  When to seek medical advice  Call your healthcare provider if any of these occur:  · Facial pain or headache becoming more severe  · Stiff neck  · Unusual drowsiness or confusion  · Swelling of the forehead or eyelids  · Vision problems, including blurred or double vision  · Fever of 100.4ºF (38ºC) or higher, or as directed by your healthcare provider  · Seizure  · Breathing problems  · Symptoms not resolving within 10 days  Date Last Reviewed: 4/13/2015  © 9706-5918 [employer]. [address] [place] [address], [place] [address]. All rights reserved. This information is not intended as a substitute for professional medical care. Always follow your healthcare professional's instructions.

## 2019-10-03 ENCOUNTER — PATIENT OUTREACH (OUTPATIENT)
Dept: ADMINISTRATIVE | Facility: HOSPITAL | Age: 49
End: 2019-10-03

## 2019-10-03 DIAGNOSIS — Z12.11 COLON CANCER SCREENING: Primary | ICD-10-CM

## 2021-05-27 ENCOUNTER — PATIENT OUTREACH (OUTPATIENT)
Dept: ADMINISTRATIVE | Facility: HOSPITAL | Age: 51
End: 2021-05-27